# Patient Record
Sex: FEMALE | Race: WHITE | Employment: OTHER | URBAN - METROPOLITAN AREA
[De-identification: names, ages, dates, MRNs, and addresses within clinical notes are randomized per-mention and may not be internally consistent; named-entity substitution may affect disease eponyms.]

---

## 2019-11-19 PROBLEM — M40.15 OTHER SECONDARY KYPHOSIS, THORACOLUMBAR REGION: Status: ACTIVE | Noted: 2019-11-19

## 2020-01-01 ENCOUNTER — APPOINTMENT (OUTPATIENT)
Dept: GENERAL RADIOLOGY | Facility: HOSPITAL | Age: 85
End: 2020-01-01
Attending: EMERGENCY MEDICINE
Payer: MEDICARE

## 2020-01-01 ENCOUNTER — HOSPITAL ENCOUNTER (EMERGENCY)
Facility: HOSPITAL | Age: 85
Discharge: HOME | End: 2020-01-01
Attending: EMERGENCY MEDICINE
Payer: MEDICARE

## 2020-01-01 ENCOUNTER — APPOINTMENT (OUTPATIENT)
Dept: GENERAL RADIOLOGY | Facility: HOSPITAL | Age: 85
DRG: 177 | End: 2020-01-01
Attending: EMERGENCY MEDICINE
Payer: MEDICARE

## 2020-01-01 ENCOUNTER — APPOINTMENT (OUTPATIENT)
Dept: CT IMAGING | Facility: HOSPITAL | Age: 85
DRG: 177 | End: 2020-01-01
Attending: HOSPITALIST
Payer: MEDICARE

## 2020-01-01 ENCOUNTER — HOSPITAL ENCOUNTER (INPATIENT)
Facility: HOSPITAL | Age: 85
LOS: 3 days | Discharge: NURSING, REHAB OR LONG-TERM CARE | DRG: 177 | End: 2020-01-01
Attending: EMERGENCY MEDICINE | Admitting: HOSPITALIST
Payer: MEDICARE

## 2020-01-01 ENCOUNTER — APPOINTMENT (OUTPATIENT)
Dept: CT IMAGING | Facility: HOSPITAL | Age: 85
End: 2020-01-01
Attending: EMERGENCY MEDICINE
Payer: MEDICARE

## 2020-01-01 VITALS
OXYGEN SATURATION: 95 % | WEIGHT: 115.06 LBS | BODY MASS INDEX: 24 KG/M2 | RESPIRATION RATE: 18 BRPM | DIASTOLIC BLOOD PRESSURE: 86 MMHG | SYSTOLIC BLOOD PRESSURE: 140 MMHG | TEMPERATURE: 97 F | HEART RATE: 70 BPM

## 2020-01-01 VITALS
RESPIRATION RATE: 20 BRPM | SYSTOLIC BLOOD PRESSURE: 130 MMHG | DIASTOLIC BLOOD PRESSURE: 90 MMHG | HEART RATE: 100 BPM | WEIGHT: 100 LBS | OXYGEN SATURATION: 99 % | TEMPERATURE: 99 F | BODY MASS INDEX: 21 KG/M2

## 2020-01-01 DIAGNOSIS — U07.1 COVID-19 VIRUS INFECTION: Primary | ICD-10-CM

## 2020-01-01 DIAGNOSIS — R09.02 HYPOXIA: ICD-10-CM
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
## 2020-01-01 DIAGNOSIS — E86.0 DEHYDRATION: ICD-10-CM

## 2020-01-01 DIAGNOSIS — S00.03XA HEMATOMA OF SCALP, INITIAL ENCOUNTER: ICD-10-CM

## 2020-01-01 DIAGNOSIS — S51.009A: ICD-10-CM

## 2020-01-01 PROCEDURE — 85379 FIBRIN DEGRADATION QUANT: CPT | Performed by: NURSE PRACTITIONER

## 2020-01-01 PROCEDURE — 84132 ASSAY OF SERUM POTASSIUM: CPT | Performed by: INTERNAL MEDICINE

## 2020-01-01 PROCEDURE — 99285 EMERGENCY DEPT VISIT HI MDM: CPT

## 2020-01-01 PROCEDURE — 81001 URINALYSIS AUTO W/SCOPE: CPT | Performed by: EMERGENCY MEDICINE

## 2020-01-01 PROCEDURE — 71045 X-RAY EXAM CHEST 1 VIEW: CPT | Performed by: EMERGENCY MEDICINE

## 2020-01-01 PROCEDURE — 36415 COLL VENOUS BLD VENIPUNCTURE: CPT

## 2020-01-01 PROCEDURE — 96360 HYDRATION IV INFUSION INIT: CPT

## 2020-01-01 PROCEDURE — 83615 LACTATE (LD) (LDH) ENZYME: CPT | Performed by: HOSPITALIST

## 2020-01-01 PROCEDURE — 86140 C-REACTIVE PROTEIN: CPT | Performed by: NURSE PRACTITIONER

## 2020-01-01 PROCEDURE — 70450 CT HEAD/BRAIN W/O DYE: CPT | Performed by: EMERGENCY MEDICINE

## 2020-01-01 PROCEDURE — 82728 ASSAY OF FERRITIN: CPT | Performed by: NURSE PRACTITIONER

## 2020-01-01 PROCEDURE — 93010 ELECTROCARDIOGRAM REPORT: CPT | Performed by: INTERNAL MEDICINE

## 2020-01-01 PROCEDURE — 70450 CT HEAD/BRAIN W/O DYE: CPT | Performed by: HOSPITALIST

## 2020-01-01 PROCEDURE — 85025 COMPLETE CBC W/AUTO DIFF WBC: CPT | Performed by: HOSPITALIST

## 2020-01-01 PROCEDURE — 84145 PROCALCITONIN (PCT): CPT | Performed by: INTERNAL MEDICINE

## 2020-01-01 PROCEDURE — 97162 PT EVAL MOD COMPLEX 30 MIN: CPT

## 2020-01-01 PROCEDURE — 85027 COMPLETE CBC AUTOMATED: CPT | Performed by: HOSPITALIST

## 2020-01-01 PROCEDURE — 80048 BASIC METABOLIC PNL TOTAL CA: CPT | Performed by: EMERGENCY MEDICINE

## 2020-01-01 PROCEDURE — 85025 COMPLETE CBC W/AUTO DIFF WBC: CPT | Performed by: EMERGENCY MEDICINE

## 2020-01-01 PROCEDURE — 93005 ELECTROCARDIOGRAM TRACING: CPT

## 2020-01-01 PROCEDURE — 90471 IMMUNIZATION ADMIN: CPT

## 2020-01-01 PROCEDURE — 72125 CT NECK SPINE W/O DYE: CPT | Performed by: EMERGENCY MEDICINE

## 2020-01-01 PROCEDURE — 83615 LACTATE (LD) (LDH) ENZYME: CPT | Performed by: NURSE PRACTITIONER

## 2020-01-01 PROCEDURE — 86140 C-REACTIVE PROTEIN: CPT | Performed by: EMERGENCY MEDICINE

## 2020-01-01 PROCEDURE — 82550 ASSAY OF CK (CPK): CPT | Performed by: EMERGENCY MEDICINE

## 2020-01-01 PROCEDURE — 85379 FIBRIN DEGRADATION QUANT: CPT | Performed by: HOSPITALIST

## 2020-01-01 PROCEDURE — 93010 ELECTROCARDIOGRAM REPORT: CPT | Performed by: EMERGENCY MEDICINE

## 2020-01-01 PROCEDURE — 82728 ASSAY OF FERRITIN: CPT | Performed by: EMERGENCY MEDICINE

## 2020-01-01 PROCEDURE — 82728 ASSAY OF FERRITIN: CPT | Performed by: HOSPITALIST

## 2020-01-01 PROCEDURE — 97166 OT EVAL MOD COMPLEX 45 MIN: CPT

## 2020-01-01 PROCEDURE — 80053 COMPREHEN METABOLIC PANEL: CPT | Performed by: HOSPITALIST

## 2020-01-01 PROCEDURE — 97530 THERAPEUTIC ACTIVITIES: CPT

## 2020-01-01 PROCEDURE — 84484 ASSAY OF TROPONIN QUANT: CPT | Performed by: EMERGENCY MEDICINE

## 2020-01-01 PROCEDURE — 84132 ASSAY OF SERUM POTASSIUM: CPT | Performed by: HOSPITALIST

## 2020-01-01 PROCEDURE — 87641 MR-STAPH DNA AMP PROBE: CPT | Performed by: EMERGENCY MEDICINE

## 2020-01-01 RX ORDER — BISACODYL 10 MG
10 SUPPOSITORY, RECTAL RECTAL
Status: DISCONTINUED | OUTPATIENT
Start: 2020-01-01 | End: 2020-01-01

## 2020-01-01 RX ORDER — ONDANSETRON 2 MG/ML
4 INJECTION INTRAMUSCULAR; INTRAVENOUS EVERY 6 HOURS PRN
Status: DISCONTINUED | OUTPATIENT
Start: 2020-01-01 | End: 2020-01-01

## 2020-01-01 RX ORDER — POTASSIUM CHLORIDE 20 MEQ/1
40 TABLET, EXTENDED RELEASE ORAL ONCE
Status: COMPLETED | OUTPATIENT
Start: 2020-01-01 | End: 2020-01-01

## 2020-01-01 RX ORDER — ACETAMINOPHEN 325 MG/1
650 TABLET ORAL EVERY 6 HOURS PRN
Status: DISCONTINUED | OUTPATIENT
Start: 2020-01-01 | End: 2020-01-01

## 2020-01-01 RX ORDER — ENOXAPARIN SODIUM 100 MG/ML
40 INJECTION SUBCUTANEOUS DAILY
Status: DISCONTINUED | OUTPATIENT
Start: 2020-01-01 | End: 2020-01-01

## 2020-01-01 RX ORDER — ASPIRIN 325 MG
325 TABLET, DELAYED RELEASE (ENTERIC COATED) ORAL DAILY
Status: DISCONTINUED | OUTPATIENT
Start: 2020-01-01 | End: 2020-01-01

## 2020-01-01 RX ORDER — POTASSIUM CHLORIDE 20 MEQ/1
40 TABLET, EXTENDED RELEASE ORAL EVERY 4 HOURS
Status: COMPLETED | OUTPATIENT
Start: 2020-01-01 | End: 2020-01-01

## 2020-01-01 RX ORDER — POTASSIUM CHLORIDE 1.5 G/1.77G
40 POWDER, FOR SOLUTION ORAL ONCE
Status: COMPLETED | OUTPATIENT
Start: 2020-01-01 | End: 2020-01-01

## 2020-01-01 RX ORDER — FUROSEMIDE 10 MG/ML
20 INJECTION INTRAMUSCULAR; INTRAVENOUS ONCE
Status: COMPLETED | OUTPATIENT
Start: 2020-01-01 | End: 2020-01-01

## 2020-01-01 RX ORDER — POLYETHYLENE GLYCOL 3350 17 G/17G
17 POWDER, FOR SOLUTION ORAL DAILY PRN
Status: DISCONTINUED | OUTPATIENT
Start: 2020-01-01 | End: 2020-01-01

## 2020-01-01 RX ORDER — SODIUM CHLORIDE 0.9 % (FLUSH) 0.9 %
3 SYRINGE (ML) INJECTION AS NEEDED
Status: DISCONTINUED | OUTPATIENT
Start: 2020-01-01 | End: 2020-01-01

## 2020-01-01 RX ORDER — SODIUM PHOSPHATE, DIBASIC AND SODIUM PHOSPHATE, MONOBASIC 7; 19 G/133ML; G/133ML
1 ENEMA RECTAL ONCE AS NEEDED
Status: DISCONTINUED | OUTPATIENT
Start: 2020-01-01 | End: 2020-01-01

## 2020-01-01 RX ORDER — METOCLOPRAMIDE HYDROCHLORIDE 5 MG/ML
5 INJECTION INTRAMUSCULAR; INTRAVENOUS EVERY 8 HOURS PRN
Status: DISCONTINUED | OUTPATIENT
Start: 2020-01-01 | End: 2020-01-01

## 2020-01-01 RX ORDER — DEXTROSE MONOHYDRATE 25 G/50ML
50 INJECTION, SOLUTION INTRAVENOUS ONCE
Status: DISCONTINUED | OUTPATIENT
Start: 2020-01-01 | End: 2020-01-01

## 2020-01-01 RX ORDER — ENOXAPARIN SODIUM 100 MG/ML
0.6 INJECTION SUBCUTANEOUS EVERY 12 HOURS SCHEDULED
Status: DISCONTINUED | OUTPATIENT
Start: 2020-01-01 | End: 2020-01-01

## 2020-01-01 RX ORDER — DOCUSATE SODIUM 100 MG/1
100 CAPSULE, LIQUID FILLED ORAL 2 TIMES DAILY
Status: DISCONTINUED | OUTPATIENT
Start: 2020-01-01 | End: 2020-01-01

## 2020-11-02 NOTE — ED NOTES
[facility] at bedside to take pt back to [address] A&Ox3 per baseline at discharge. Pt denies any complaints at this time. Discharge instructions reviewed with pt. No further questions at this time. Pt understands when to return to ED.

## 2020-11-02 NOTE — ED PROVIDER NOTES
Patient Seen in: [facility] AND [facility] Emergency Department    History   Patient presents with:  Fall  Covid    Stated Complaint: Varun Giordano     HPI    79 yo F with PMH HTN presenting for evaluation of status post unwitnessed fall.   Patient reluctant histori complaint: falll, COVID  Other systems are as noted in HPI. Constitutional and vital signs reviewed. All other systems reviewed and negative except as noted above. PSFH elements reviewed from today and agreed except as otherwise stated in HPI. normal limits   URINALYSIS WITH CULTURE REFLEX - Abnormal; Notable for the following components:    Ketones Urine 20  (*)     Blood Urine Moderate (*)     Protein Urine 100  (*)     RBC URINE 16 (*)     All other components within normal limits   CK CREATI the white matter; mild volume loss    -Cataract surgery on the left; probable small, calcified meningioma in the right frontoparietal region    C-spine:  -No definite evidence of acute fractures; mild loss of height in T2 is most likely old  -Moderate mult faxed/electronically transmitted at 8184 EST. If there are any questions please feel free to contact me directly at [phone], ext [phone]. If you cannot reach me at this number, do not leave a voicemail.  Please call [phone] ext [phone] and ask for the nex doctor for a repeat blood pressure check and further discussion of lifestyle modifications that include Weight Reduction - Dietary Sodium Restriction - Increased Physical Activity and Moderation in alcohol (ETOH) Consumption.  If possible check your pressur

## 2020-11-02 NOTE — ED NOTES
Report given to THALIA Carmichael from [facility]. Per Blanquita there is no nurse there at this time.  Blanquita aware of pt returning to facility,

## 2020-11-02 NOTE — ED INITIAL ASSESSMENT (HPI)
Pt presents to ED via EMS for an unwitnessed fall. Pt arrives in a c collar and back board in place by medics. Pt has hematoma to the right side of her forehead. Skin tears to the right and left elbow. Pt is A&Ox1. Pt vomited x1 per medics.  Pt responsive t

## 2020-11-03 PROBLEM — U07.1 COVID-19 VIRUS INFECTION: Status: ACTIVE | Noted: 2020-01-01

## 2020-11-03 PROBLEM — R09.02 HYPOXIA: Status: ACTIVE | Noted: 2020-01-01

## 2020-11-03 NOTE — ED INITIAL ASSESSMENT (HPI)
Pt RICKIE from [facility] with c/o worsening s/s, shortness of breath, body aches, lethargic. Pt AOx4, speaking in complete sentences.

## 2020-11-04 NOTE — OCCUPATIONAL THERAPY NOTE
*SPO2 Room Air 98% at rest      [address] [place] [address]     Room Number: 521/521-A  Evaluation Date: 11/4/2020  Type of Evaluation: Initial  Presenting Problem: (hypoxia)    Physician Order: IP Consult to Occupational Therapy  Reason fo patients with this level of impairment may benefit from ENOCH. She was left in bedside chair with alarm on, call light in reach, monitors in place and all needs met. RN aware.      DISCHARGE RECOMMENDATIONS  OT Discharge Recommendations: 24 hour care/supervis COORDINATION  Gross Motor: WFL   Fine Motor: WFL     ADDITIONAL TESTS                                    NEUROLOGICAL FINDINGS                   ACTIVITIES OF DAILY LIVING ASSESSMENT  AM-PAC ‘6-Clicks’ Inpatient Daily Activity Short Form  How much help

## 2020-11-04 NOTE — CONSULTS
[facility] AND [facility] Infectious Disease  Report of Consultation    [facility] [place] Patient Status:  Inpatient    1923 MRN [id number]   Location [facility]5W Attending Magaly Pereira MD   Hosp Day # 1 PCP Horacio Stone MD     Date of Admiss (COLACE) cap 100 mg, 100 mg, Oral, BID  •  PEG 3350 (MIRALAX) powder packet 17 g, 17 g, Oral, Daily PRN  •  magnesium hydroxide (MILK OF MAGNESIA) 400 MG/5ML suspension 30 mL, 30 mL, Oral, Daily PRN  •  bisacodyl (DULCOLAX) rectal suppository 10 mg, 10 mg, 18 95 % —   11/03/20 1658 135/84 97.6 °F (36.4 °C) Oral 90 21 95 % 115 lb 1.3 oz (52.2 kg)       Intake/Output:  No intake/output data recorded. Physical Exam:   General: Awake, alert, non-tox and in NAD.    Head: Normocephalic, without obvious abnormali inpatient. Trending temps and WBCs. 95 [facility] labs. Will consider steroids/remdesivir/plasma if any worsening O2 requirements. Check PCT - possible addition of empiric antibiotics given CXR findings. Will follow. Hortencia Kennedy Husbands, Buzz Salvage  DM

## 2020-11-04 NOTE — PLAN OF CARE
Problem: Patient Centered Care  Goal: Patient preferences are identified and integrated in the patient's plan of care  Description: Interventions:  - What would you like us to know as we care for you?  I'm from [facility]   - Provide timely, complete, and GENITOURINARY - ADULT  Goal: Absence of urinary retention  Description: INTERVENTIONS:  - Assess patient’s ability to void and empty bladder  - Monitor intake/output and perform bladder scan as needed  - Follow urinary retention protocol/standard of care memory  Description: Interventions:  - Minimize distractions in the room when full attention is required  Outcome: Progressing     Problem: PAIN - ADULT  Goal: Verbalizes/displays adequate comfort level or patient's stated pain goal  Description: INTERVENT

## 2020-11-04 NOTE — ED NOTES
Orders for admission, patient is aware of plan and ready to go upstairs, any questions, please call ED RN Bartolo Holt at ext: [phone]

## 2020-11-04 NOTE — H&P
[facility] Hospitalist H&P       CC: Patient presents with:  Difficulty Breathing       PCP: Deepa Trinidad MD    History of Present Illness: Patient is a 80year old female with PMH sig for HTN, OA, [place], who presents with weakness, and COVID-19.   Patient i Lips, mucosa, and tongue normal    Neck: Supple,    Lungs:   Crackles bilaterally    Chest wall:  No tenderness or deformity. Heart:  Regular rate and rhythm    Abdomen:   Soft, non-tender. Bowel sounds normal. No masses,  No organomegaly.  Non distended of T2 without evidence of osseous retropulsion. A preliminary report was issued by the [address] [place] [address] Radiology teleradiology service. There are no major discrepancies.     Dictated by (CST): Janice Lyles MD on 11/02/2020 at 7:23 AM     Finalized by (CST): Zak agreeing with therapeutic plan as outlined    Thank Jessica Holcomb MD    [facility] Hospitalist  Answering Service number: [phone]

## 2020-11-04 NOTE — PLAN OF CARE
Pt AO x 1-2. Unable to answer questions about PMH and medications. Pt's JESSICAA Yulia (daughter), updated on status and assisted with history. Oriented to unit. On RA saturating 90-93%. Potassium covered. VSS. High fall risk.  Bed alarm on, bed in lowest pos retention  Description: INTERVENTIONS:  - Assess patient’s ability to void and empty bladder  - Monitor intake/output and perform bladder scan as needed  - Follow urinary retention protocol/standard of care  - Consider collaborating with pharmacy to review Nivia Arias, RN  Outcome: Progressing  11/4/2020 0353 by Nivia Arias, RN  Outcome: Progressing

## 2020-11-04 NOTE — PHYSICAL THERAPY NOTE
PHYSICAL THERAPY EVALUATION - INPATIENT     Room Number: 521/521-A  Evaluation Date: 11/4/2020  Type of Evaluation: Initial   Physician Order: PT Eval and Treat    Presenting Problem: +COVID (11/3)  Reason for Therapy: Mobility Dysfunction and Discharge P discharge. DISCHARGE RECOMMENDATIONS  PT Discharge Recommendations: Sub-acute rehabilitation    PLAN  PT Treatment Plan: Bed mobility; Body mechanics; Endurance; Energy conservation;Patient education; Family education;Range of motion;Strengthening;Transfer Sitting: Fair -  Static Standing: Poor +  Dynamic Standing: Poor    ACTIVITY TOLERANCE  Pulse: 66  Heart Rate Source: Monitor     BP: (!) 128/95  BP Location: Right arm  BP Method: Automatic  Patient Position: Sitting    O2 WALK  SPO2 on Room Air at Rest: #2  Current Status    Goal #3 Patient is able to ambulate 25 feet with assist device: walker - rolling at assistance level: minimum assistance   Goal #3   Current Status    Goal #5 Patient to demonstrate independence with home activity/exercise instruction

## 2020-11-04 NOTE — PROGRESS NOTES
Pt covid+ 10/30 at facility. Symptoms started unclear  Currently on RA SpO2 94%, afebrile. Inflammatory markers trending down. Continue Lovenox. Trend inflammatory markers. From [facility]. Will continue to monitor.     Khang Ch, APRN  11/4/2

## 2020-11-04 NOTE — CM/SW NOTE
[facility], slt confused    Hx Fall           POA, Cande/dtr  COVID (+)  On RA  PT/OT pndg    CM/SW to remain available for support and/or discharge planning.     Cassia Parry RN, [facility]    Ext. [phone]

## 2020-11-04 NOTE — PROGRESS NOTES
Code status not on file. Pt arrived to floor with copy of Polst form with DNR status. Talked to patient's POA (daughter Jun Lenz) about status on admission and she confirmed patient is a complete DNAR.  On call [facility] hospitalist (Dr. Kriss Askew) notified; prefers da

## 2020-11-04 NOTE — ED PROVIDER NOTES
Patient Seen in: [facility] Emergency Department      History   Patient presents with:  Difficulty Breathing    Stated Complaint: covid, worsening symptoms    HPI    19-year-old female presents the ER for \"worsening Covid-like symptoms. \"  Lorena Mcknight range of motion and neck supple. Cardiovascular:      Rate and Rhythm: Normal rate and regular rhythm. Pulses: Normal pulses. Heart sounds: Normal heart sounds.    Pulmonary:      Effort: Pulmonary effort is normal.      Breath sounds: Examinati ------                     CBC W/ DIFFERENTIAL[[id number]]          Abnormal            Final result                 Please view results for these tests on the individual orders.    RAINBOW DRAW BLUE   RAINBOW DRAW LAVENDER   RAINBOW DRAW LIGHT GREEN

## 2020-11-05 NOTE — PLAN OF CARE
Problem: Patient Centered Care  Goal: Patient preferences are identified and integrated in the patient's plan of care  Description: Interventions:  - What would you like us to know as we care for you?  I'm from [facility]   - Provide timely, complete, and GENITOURINARY - ADULT  Goal: Absence of urinary retention  Description: INTERVENTIONS:  - Assess patient’s ability to void and empty bladder  - Monitor intake/output and perform bladder scan as needed  - Follow urinary retention protocol/standard of care equipment as needed  - Ensure adequate protection for wounds/incisions during mobilization  - Obtain PT/OT consults as needed  - Advance activity as appropriate  - Communicate ordered activity level and limitations with patient/family  Outcome: Progressing

## 2020-11-05 NOTE — CM/SW NOTE
Hospice Order Received:  MSW placed call to pts lucy, Janay and left vm; provided cb# will await return call.

## 2020-11-05 NOTE — PLAN OF CARE
Pt alert x2; confused. Needs redirection at times. Ambulating to bathroom 1 assist with walker during day. Up to bathroom with rolling chair this morning d/t pt feeling weak. K+ elevated; MD notified. Lasix given. Tylenol given for back pain. On RA. VSS. breathe, Incentive Spirometry  - Assess the need for suctioning and perform as needed  - Assess and instruct to report SOB or any respiratory difficulty  - Respiratory Therapy support as indicated  - Manage/alleviate anxiety  - Monitor for signs/symptoms o Encourage pt to monitor pain and request assistance  - Assess pain using appropriate pain scale  - Administer analgesics based on type and severity of pain and evaluate response  - Implement non-pharmacological measures as appropriate and evaluate response

## 2020-11-05 NOTE — PROGRESS NOTES
[facility] Hospitalist Progress Note     CC: Hospital Follow up    PCP: Norah Blank MD       Assessment/Plan:     Principal Problem:    COVID-19 virus infection  Active Problems:    Hypoxia  Patient is a 80year old female with PMH sig for HTN, OA, [place], wh Oral, resp. rate 18, weight 115 lb 1.3 oz (52.2 kg), SpO2 94 %, not currently breastfeeding.     Temp:  [97.7 °F (36.5 °C)-98.3 °F (36.8 °C)] 98.3 °F (36.8 °C)  Pulse:  [63-75] 65  Resp:  [18-20] 18  BP: (121-141)/(80-94) 121/81      Intake/Output:  No [place] right cerebral convexities with mild extension over the posterior right tentorial leaflet. There is new resulting mass effect on the underlying cortical gyri with 4 mm leftward subfalcine herniation. No hydrocephalus or effacement of the basal cisterns.

## 2020-11-05 NOTE — CM/SW NOTE
Addendum 11/5/20 3:18pm  EDMUND updated by Desmond Longo 19. Dtr considering hospice care. She will talk w/family tonight & hospice to follow up tomorrow. Dtr also considering placement at [facility] under private pay w/hospice.   SW placed refer

## 2020-11-05 NOTE — PROGRESS NOTES
Pt covid+ 10/30 at facility. From [facility]  Currently on RA SpO2 93%, afebrile. CT head 11/5 noted with subdural hematoma. Hospice consult placed - not inpatient appropriate, discharge with hospice per family wishes.  Hospice awaiting call back f

## 2020-11-05 NOTE — CM/SW NOTE
MSW received return call from pts Celeste ayala; MSW provided hospice information and advised that pt is appropriate for Routine level of care and may possibly be able to return to [facility] facility for long term care with hospice bed; carlos alberto rubio

## 2020-11-06 NOTE — CM/SW NOTE
D/c planning:  Per rounds this morning, pt is medically clear to d/c. SN[facility] referral- pt was not accepted, bed is not available. SW had lengthy discussions w/[facility] liaison Marybel Martinez ([phone]) & [facility] ([phone]).   [facility]

## 2020-11-06 NOTE — PROGRESS NOTES
[facility] Hospitalist Progress Note     CC: Hospital Follow up    PCP: Francia Penny MD       Assessment/Plan:     Principal Problem:    COVID-19 virus infection  Active Problems:    Hypoxia  Patient is a 80year old female with PMH sig for HTN, OA, [place], wh temperature source Axillary, resp. rate 18, weight 115 lb 1.3 oz (52.2 kg), SpO2 95 %, not currently breastfeeding.     Temp:  [97.3 °F (36.3 °C)-97.5 °F (36.4 °C)] 97.3 °F (36.3 °C)  Pulse:  [70] 70  Resp:  [18-20] 18  BP: (127-140)/(86-90) 140/86      Int Or Head (19068)    Result Date: 11/5/2020  CONCLUSION:  1. Development of an acute right subdural hematoma measuring 1.0 cm thickness over the right cerebral convexities with mild extension over the posterior right tentorial leaflet.   There is new resultin

## 2020-11-06 NOTE — OCCUPATIONAL THERAPY NOTE
Attempted OT treatment this afternoon. Patient received in bed alert requesting to remain in bed to sleep. Encouraged her to get up to have her lunch, however she continued to decline. Per chart, patient is to d/c on hospice.  She is not appropriate to part

## 2020-11-06 NOTE — DISCHARGE SUMMARY
General Medicine Discharge Summary     Patient ID:  Karla Funez  80year old  4/17/1923    Admit date: 11/3/2020    Discharge date and time: 11/6/2020    Attending Physician: Magaly Pereira MD     Consults: IP CONSULT TO HOSPITALIST  NURSING CONSULT TO [address] [place] [address] findings with daughter and patient, who declined neurosurgery evaluation they both stated that surgery is not an option, prefer comfort measures only at this time.   Hospice consulted discussed with family and patient plan for discharge to SNF, with transit generalized atrophy and mild chronic microangiopathic ischemic changes with large vessel calcific atherosclerosis. Results of this examination were discussed with the patient's physician, Dr. Delia Garcia, by Dr. Jose Flores at 9:40 on 11/05/2020.   Dictated b instructions:       Other Discharge Instructions:       No blood thinners           Total Time Coordinating Care: Greater than 30 minutes    Patient had opportunity to ask questions and state understand and agree with therapeutic plan as outlined    Thank Y

## 2020-11-06 NOTE — PROGRESS NOTES
[facility] Infectious Disease Progress Note    Geraldine Akron Patient Status:  Inpatient    1923 MRN [id number]   Location [facility]5W Attending Andres Hillman MD   Hosp Day # 3 PCP jS Ibrahim MD     Subjective:  Pt seen from Joyce Felipe, NP

## 2020-11-06 NOTE — PLAN OF CARE
Pt is alert to self, remains above 90% on room air. PRN Tylenol given for pain management. Voiding. Remote tele in place. Fall precautions in place. Call light within reach.     Problem: Patient Centered Care  Goal: Patient preferences are identified and in respiratory difficulty  - Respiratory Therapy support as indicated  - Manage/alleviate anxiety  - Monitor for signs/symptoms of CO2 retention  Outcome: Progressing     Problem: GENITOURINARY - ADULT  Goal: Absence of urinary retention  Description: Stan Gabriel activity level and limitations with patient/family  Outcome: Progressing     Problem: Impaired Cognition  Goal: Patient will exhibit improved attention, thought processing and/or memory  Description: Interventions:    Outcome: Progressing     Problem: PAIN

## 2020-11-06 NOTE — PROGRESS NOTES
Pt covid+ 10/30 at facility. From [facility]  Currently on RA SpO2 95%, afebrile. CT head 11/5 noted with subdural hematoma. Hospice following, plan is [facility] with hospice bt no beds are available.     SW to talk with family  Home with hospice w

## 2020-11-06 NOTE — PHYSICAL THERAPY NOTE
Attempted PT treatment. Patient supine in bed- RN approved activity. Patient declined OOB activity, requesting to sleep. Per discussion with RN, patient has been assisted to chair for most meals. Plan to DC on hospice.  No further skilled IP PT warranted at

## 2020-11-06 NOTE — PLAN OF CARE
Problem: Patient Centered Care  Goal: Patient preferences are identified and integrated in the patient's plan of care  Description: Interventions:  - What would you like us to know as we care for you?  I'm from [facility]   - Provide timely, complete, and GENITOURINARY - ADULT  Goal: Absence of urinary retention  Description: INTERVENTIONS:  - Assess patient’s ability to void and empty bladder  - Monitor intake/output and perform bladder scan as needed  - Follow urinary retention protocol/standard of care awaiting on bed, stable, up to chair for meals, call light within reach, will continue to monitor.

## 2020-11-06 NOTE — DIETARY NOTE
Brief Nutrition Note    Pt screened at risk for wt loss and poor PO. +COVID. Noted plans for pt to discharge to SNF on hospice. No nutrition assessment needed at this time given hospice plans. Will monitor.  Please consult nutrition services if nutrition as

## 2020-11-07 NOTE — PLAN OF CARE
Problem: Patient Centered Care  Goal: Patient preferences are identified and integrated in the patient's plan of care  Description: Interventions:  - What would you like us to know as we care for you?  I'm from [facility]   - Provide timely, complete, and including cough, deep breathe, Incentive Spirometry  - Assess the need for suctioning and perform as needed  - Assess and instruct to report SOB or any respiratory difficulty  - Respiratory Therapy support as indicated  - Manage/alleviate anxiety  - [place]

## 2022-01-01 NOTE — PROGRESS NOTES
[facility] AND CLINICS  [facility] Infectious Disease Progress Note    Blu Menard Patient Status:  Inpatient    1923 MRN [id number]   [facility] Attending Flip Mccormick MD   Hosp Day # 2 PCP Norah Blank MD     Subjective:  Pt not physic Assessment/Plan:    1. COVID PNA  -on room air  -SARS-COV-2 positive on 11/3  -PCT is 0.31  -abnormal COVID labs: ferritin 604 (up), d-dimer 2.54 (down), CRP 10.6 (up)  2.  Dispo  -pt not a candidate for CCP or remdesivir at this time  -pt possibly t Name band;

## (undated) NOTE — IP AVS SNAPSHOT
Patient Demographics     Address  [address]  [place] [address] Phone  [phone] [facility]) *Preferred*  [phone] (Work)  [phone] [facility]) E-mail Address  [email or web address]      Emergency Contact(s)     Name Relation Home W [id number] acetaminophen (TYLENOL) tab 650 mg 11/06/20 1146 Given      [id number] docusate sodium (COLACE) cap 100 mg 11/05/20 2134 Given      [id number] docusate sodium (COLACE) cap 100 mg 11/06/20 0922 Given      [id number] metoprolol Tartrate (LOPRESSOR) t Ordering provider: IVIS Manley  11/05/20 2300 Resulting lab: [facility])    Specimen Information    Type Source Collected On   Blood — 11/06/20 0720          Components    Component Value Reference Range Flag Lab Joe [address] [facility]) [facility] ([facility]) Juju Long January LEONIDES [address]  [facility] [address] 03/19/20 1442 - Present            Microbiology Results (All)     Procedure Component Value Units Date/Time    Emergency • LEFT PHACOEMULSIFICATION OF CATARACT WITH INTRAOCULAR LENS IMPLANT 24560 Left 11/12/2018    Performed by Robin Baker MD at [facility]        ALL:  No Known Allergies     Home Medications:  No outpatient medications have been marked as ta ALT  --  24  --    AST  --  47*  --    ALB  --  2.6*  --    *  --  414*       Recent Labs   Lab 11/02/20  0125   TROP <0.045       Radiology: Ct Brain Or Head (22558)    Result Date: 11/2/2020  CONCLUSION:   No evidence of acute intracranial abnorma CONCLUSION: No evidence of acute cardiopulmonary disease. A preliminary report was issued by the [address] [place] [address] Radiology teleradiology service. There are no major discrepancies.     Dictated by (CST): Esperanza Saleem MD on 11/02/2020 at 6:26 AM     Finalized by 1. Consult to Infectious Disease [[id number]] ordered by Yasmeen Acuna MD at [facility] AT [facility] Infectious Disease  Report of Consultation[MT. 1]    Mariam Gloria[MT. 2] Patient Status:  Inpatient    1923 MRN [id number] •  Normal Saline Flush 0.9 % injection 3 mL, 3 mL, Intravenous, PRN  •  Enoxaparin Sodium (LOVENOX) 40 MG/0.4ML injection 40 mg, 40 mg, Subcutaneous, Daily  •  acetaminophen (TYLENOL) tab 650 mg, 650 mg, Oral, Q6H PRN  •  docusate sodium (COLACE) cap 100 m 11/03/20 2234 145/85 97.6 °F (36.4 °C) Oral 82 20 92 % —   11/03/20 2200 124/66 — — 66 23 93 % —   11/03/20 2115 129/85 — — 64 21 96 % —   11/03/20 2030 126/78 — — 70 24 99 % —   11/03/20 1900 (!) 128/99 — — 67 20 92 % —   11/03/20 1830 139/90 — — 74 25 93 - Will check PCT - possible secondary bacterial component or pneumonitis sustained with recent fall as well    2. Abnormal labs due to COVID+ status  - Ferritin 524->463  - ->414  - CRP 9.12  - D-dimer 8.23->3.90    3.   S/p fall possibly due to fat Gen: No acute distress  Pulm: Lungs clear, normal respiratory effort  CV: Heart with regular rate and rhythm  Abd: Abdomen soft,  EXT: No edema  Head: Large area of bruising over the right temple      HPI:   History of Present Illness: Patient is a 89 year –Daughter declines neurosurgery evaluation, states her mother would not want that type of intervention, discussed possibility of progression of the hematoma and mass-effect causing worsening symptoms or even death with daughter.   Daughter states her unders CONCLUSION: Mild hazy opacity in the left mid lung and left lung base which may reflect atypical viral pneumonia.     Dictated by (CST): Jc Hodges MD on 11/03/2020 at 5:48 PM     Finalized by (CST): Jc Hodges MD on 11/03/2020 at 5:50 PM Physical Therapy Notes (last 72 hours) (Notes from 11/3/2020  3:42 PM through 11/6/2020  3:42 PM)      Physical Therapy Note signed by Freya Byers PT at 11/6/2020  8:03 AM  Version 1 of 1    Author: Freya Byers, PT Service: Physical Medicine and Patient in chair at end of session with chair alarm activated and needs in reach- RN aware of pt's position.      The patient's[TL.1] Approx Degree of Impairment: 72.57%[TL.2] has been calculated based on documentation in the [facility] '6 clicks' Inpatient Basi Weight Bearing Restriction: None[TL.2]                PAIN ASSESSMENT[TL.1]  Ratin[TL. 2]          COGNITION  · Overall Cognitive Status:  Impaired  · Arousal/Alertness:  lethargic  · Memory:  decreased long term memory and decreased short term memory Stoop/Curb Assistance: Not tested[TL.2]     Exercise/Education Provided:[TL.1]  Bed mobility  Body mechanics  Energy conservation  Functional activity tolerated  Gait training  Posture  Strengthening  Transfer training[TL.3]    Patient End of Session: Up i Occupational Therapy Notes (last 72 hours) (Notes from 11/3/2020  3:42 PM through 11/6/2020  3:42 PM)      Occupational Therapy Note signed by Juanito Mccabe OT at 11/6/2020  1:56 PM  Version 1 of 1    Author: Juanito Mccabe OT Service: Rehab Author Type Patient is a 80year old female admitted 11/3/2020 for hypoxia - found to be positive for Covid19.   In this OT evaluation patient presents with the following impairments: impaired cognition, decreased activity tolerance, generalized weakness, impaired florentin OT Treatment Plan: Balance activities; Energy conservation/work simplification techniques;ADL training;Functional transfer training; Endurance training;Patient/Family education;Patient/Family training;Equipment eval/education       OCCUPATIONAL THERAPY MEDIC -   Putting on and taking off regular lower body clothing?: A Lot  -   Bathing (including washing, rinsing, drying)?: A Lot  -   Toileting, which includes using toilet, bedpan or urinal? : A Lot  -   Putting on and taking off regular upper body clothing?: INFLUENZA 09/10/12     Pneumococcal (Prevnar 13) 10/04/17     Pneumococcal (Prevnar 13) 06/29/16     Pneumovax 23 09/10/12     TDAP 11/02/20       Future Appointments        Provider Paulino Keenan    11/23/2020 10:00 AM Milvia Washington MD Internal

## (undated) NOTE — IP AVS SNAPSHOT
[facility]            (For Outpatient Use Only) Initial Admit Date: 11/3/2020   Inpt/Obs Admit Date: Inpt: 11/3/20 / Obs: N/A   Discharge Date:    Matt Lin:  [de-identified]   MRN: [de-identified]   CSN: [id number]   CEID: [id number]        NDV Group Number:  Insurance Type:    Subscriber Name:  Subscriber :    Subscriber ID:  Pt Rel to Subscriber:    Hospital Account Financial Class: Medicare    2020